# Patient Record
Sex: MALE | Race: BLACK OR AFRICAN AMERICAN | NOT HISPANIC OR LATINO | URBAN - METROPOLITAN AREA
[De-identification: names, ages, dates, MRNs, and addresses within clinical notes are randomized per-mention and may not be internally consistent; named-entity substitution may affect disease eponyms.]

---

## 2024-04-19 ENCOUNTER — CLINICAL SUPPORT (OUTPATIENT)
Dept: EMERGENCY MEDICINE | Facility: HOSPITAL | Age: 24
End: 2024-04-19
Payer: COMMERCIAL

## 2024-04-19 ENCOUNTER — APPOINTMENT (OUTPATIENT)
Dept: RADIOLOGY | Facility: HOSPITAL | Age: 24
End: 2024-04-19
Payer: COMMERCIAL

## 2024-04-19 ENCOUNTER — HOSPITAL ENCOUNTER (EMERGENCY)
Facility: HOSPITAL | Age: 24
Discharge: HOME | End: 2024-04-19
Attending: EMERGENCY MEDICINE
Payer: COMMERCIAL

## 2024-04-19 VITALS
RESPIRATION RATE: 18 BRPM | SYSTOLIC BLOOD PRESSURE: 127 MMHG | WEIGHT: 300 LBS | HEART RATE: 98 BPM | OXYGEN SATURATION: 96 % | BODY MASS INDEX: 40.63 KG/M2 | HEIGHT: 72 IN | TEMPERATURE: 97.5 F | DIASTOLIC BLOOD PRESSURE: 85 MMHG

## 2024-04-19 DIAGNOSIS — R07.9 ACUTE CHEST PAIN: Primary | ICD-10-CM

## 2024-04-19 LAB
ALBUMIN SERPL BCP-MCNC: 4 G/DL (ref 3.4–5)
ALP SERPL-CCNC: 83 U/L (ref 33–120)
ALT SERPL W P-5'-P-CCNC: 42 U/L (ref 10–52)
ANION GAP SERPL CALC-SCNC: 14 MMOL/L (ref 10–20)
AST SERPL W P-5'-P-CCNC: 26 U/L (ref 9–39)
BASOPHILS # BLD AUTO: 0.02 X10*3/UL (ref 0–0.1)
BASOPHILS NFR BLD AUTO: 0.3 %
BILIRUB SERPL-MCNC: 0.4 MG/DL (ref 0–1.2)
BUN SERPL-MCNC: 10 MG/DL (ref 6–23)
CALCIUM SERPL-MCNC: 9 MG/DL (ref 8.6–10.6)
CARDIAC TROPONIN I PNL SERPL HS: <3 NG/L (ref 0–53)
CHLORIDE SERPL-SCNC: 105 MMOL/L (ref 98–107)
CO2 SERPL-SCNC: 24 MMOL/L (ref 21–32)
CREAT SERPL-MCNC: 0.67 MG/DL (ref 0.5–1.3)
EGFRCR SERPLBLD CKD-EPI 2021: >90 ML/MIN/1.73M*2
EOSINOPHIL # BLD AUTO: 0.21 X10*3/UL (ref 0–0.7)
EOSINOPHIL NFR BLD AUTO: 2.9 %
ERYTHROCYTE [DISTWIDTH] IN BLOOD BY AUTOMATED COUNT: 14.6 % (ref 11.5–14.5)
GLUCOSE SERPL-MCNC: 94 MG/DL (ref 74–99)
HCT VFR BLD AUTO: 41.8 % (ref 41–52)
HGB BLD-MCNC: 14.3 G/DL (ref 13.5–17.5)
IMM GRANULOCYTES # BLD AUTO: 0.01 X10*3/UL (ref 0–0.7)
IMM GRANULOCYTES NFR BLD AUTO: 0.1 % (ref 0–0.9)
LYMPHOCYTES # BLD AUTO: 0.88 X10*3/UL (ref 1.2–4.8)
LYMPHOCYTES NFR BLD AUTO: 12.2 %
MCH RBC QN AUTO: 27.2 PG (ref 26–34)
MCHC RBC AUTO-ENTMCNC: 34.2 G/DL (ref 32–36)
MCV RBC AUTO: 80 FL (ref 80–100)
MONOCYTES # BLD AUTO: 0.43 X10*3/UL (ref 0.1–1)
MONOCYTES NFR BLD AUTO: 6 %
NEUTROPHILS # BLD AUTO: 5.64 X10*3/UL (ref 1.2–7.7)
NEUTROPHILS NFR BLD AUTO: 78.5 %
NRBC BLD-RTO: 0 /100 WBCS (ref 0–0)
PLATELET # BLD AUTO: 288 X10*3/UL (ref 150–450)
POTASSIUM SERPL-SCNC: 3.7 MMOL/L (ref 3.5–5.3)
PROT SERPL-MCNC: 7.3 G/DL (ref 6.4–8.2)
RBC # BLD AUTO: 5.25 X10*6/UL (ref 4.5–5.9)
SODIUM SERPL-SCNC: 139 MMOL/L (ref 136–145)
WBC # BLD AUTO: 7.2 X10*3/UL (ref 4.4–11.3)

## 2024-04-19 PROCEDURE — 93005 ELECTROCARDIOGRAM TRACING: CPT

## 2024-04-19 PROCEDURE — 85025 COMPLETE CBC W/AUTO DIFF WBC: CPT | Performed by: EMERGENCY MEDICINE

## 2024-04-19 PROCEDURE — 99285 EMERGENCY DEPT VISIT HI MDM: CPT | Performed by: EMERGENCY MEDICINE

## 2024-04-19 PROCEDURE — 84484 ASSAY OF TROPONIN QUANT: CPT | Performed by: EMERGENCY MEDICINE

## 2024-04-19 PROCEDURE — 71046 X-RAY EXAM CHEST 2 VIEWS: CPT

## 2024-04-19 PROCEDURE — 80053 COMPREHEN METABOLIC PANEL: CPT | Performed by: EMERGENCY MEDICINE

## 2024-04-19 PROCEDURE — 93010 ELECTROCARDIOGRAM REPORT: CPT | Performed by: EMERGENCY MEDICINE

## 2024-04-19 PROCEDURE — 36415 COLL VENOUS BLD VENIPUNCTURE: CPT | Performed by: EMERGENCY MEDICINE

## 2024-04-19 PROCEDURE — 99283 EMERGENCY DEPT VISIT LOW MDM: CPT | Mod: 25

## 2024-04-19 PROCEDURE — 71046 X-RAY EXAM CHEST 2 VIEWS: CPT | Performed by: RADIOLOGY

## 2024-04-19 ASSESSMENT — COLUMBIA-SUICIDE SEVERITY RATING SCALE - C-SSRS
6. HAVE YOU EVER DONE ANYTHING, STARTED TO DO ANYTHING, OR PREPARED TO DO ANYTHING TO END YOUR LIFE?: NO
1. IN THE PAST MONTH, HAVE YOU WISHED YOU WERE DEAD OR WISHED YOU COULD GO TO SLEEP AND NOT WAKE UP?: NO
2. HAVE YOU ACTUALLY HAD ANY THOUGHTS OF KILLING YOURSELF?: NO

## 2024-04-20 NOTE — ED PROVIDER NOTES
HPI   Chief Complaint   Patient presents with    Chest Pain    Shortness of Breath       HPI  Patient is a 23-year-old male with no reported past medical history presenting to the emergency department for 1 day history of midsternal chest pain and tightness. Patient states that he started having midsternal chest pain today while driving. The pain comes and goes and he is currently not experiencing any chest pain. He does not report any shortness of breath. No history of blood clots, tobacco use or long periods of travel. No recent surgeries. No history of cardiac disease. No cough or hemoptysis. falls or injuries. No headache, dizziness, vision changes, neck pain, nausea, vomiting, abdominal pain, numbness, tingling, cough, fevers, or chills. No leg pain or leg swelling. Pain does not radiate. Patient denies recent history of fevers, night sweats, cough, congestion, rhinorrhea, diarrhea, constipation, nausea, vomiting, sore throat, or myalgias. When asking about patient's mood and anxiety, he states that his sister just recently  and he was the one to find her. Since then, he feels that he has been emotional. He denies suicidal or self-harm ideations and states that he has a good supporting system of family members and friends that he is able to talk to about this. Patient is emotional at this time and states that he has been feeling a very large emotional burden for the past day. He denies any homicidal ideation. He denies any auditory or visual hallucinations. He reports that he feels safe at home. He denies any family history of heart attack, blood clots, or sudden cardiac death.                No data recorded                   Patient History   Past Medical History:   Diagnosis Date    Hypermetropia, unspecified eye 2017    Hyperopia    Other visual disturbances 2017    Blurred vision     No past surgical history on file.  No family history on file.  Social History     Tobacco Use    Smoking  status: Not on file    Smokeless tobacco: Not on file   Substance Use Topics    Alcohol use: Not on file    Drug use: Not on file       Physical Exam   ED Triage Vitals [04/19/24 2141]   Temperature Heart Rate Respirations BP   36.4 °C (97.5 °F) (!) 101 18 172/85      Pulse Ox Temp Source Heart Rate Source Patient Position   96 % Temporal -- --      BP Location FiO2 (%)     -- --       Physical Exam  Constitutional:       General: He is not in acute distress.     Appearance: Normal appearance. He is obese.   HENT:      Head: Normocephalic and atraumatic.   Eyes:      Extraocular Movements: Extraocular movements intact.      Pupils: Pupils are equal, round, and reactive to light.   Cardiovascular:      Rate and Rhythm: Normal rate and regular rhythm.      Comments: No tenderness to palpation over anterior chest wall. No overlying skin changes. No crepitus. No pathologic breath sounds. Breath sounds symmetric bilaterally.  Pulmonary:      Effort: Pulmonary effort is normal.      Breath sounds: Normal breath sounds. No wheezing, rhonchi or rales.   Abdominal:      General: Abdomen is flat.      Palpations: Abdomen is soft.      Tenderness: There is no abdominal tenderness.   Musculoskeletal:         General: No swelling or signs of injury. Normal range of motion.      Cervical back: Normal range of motion and neck supple.      Right lower leg: No edema.      Left lower leg: No edema.   Skin:     Findings: No rash.   Neurological:      General: No focal deficit present.      Mental Status: He is alert and oriented to person, place, and time.      Motor: No weakness.   Psychiatric:      Comments: Appearance/behavior: well groomed and appropriately dressed individual resting calmly  Cognition: A&0 x4  Mood and affect: sad mood and affect  Speech: appropriate rate, volume and fluency  Thought process: linear  Content: no derangements or delusions elicited  Perception: denies audio/visual hallucinations  Insight/judgement:  has good insight to current circumstances  Denies suicidal or homicidal ideation. Denies auditory or visual hallucinations. Does not appear internally stimulated.          ED Course & MDM   ED Course as of 24 ECG 12 lead  EKG Interpretation:   Rate: 97 BPM  Rhythm: Normal sinus rhythm  Axis: Regular  Intervals/waves: Regular MN interval and narrow QRS   ST changes: None appreciated [TRISH]      ED Course User Index  [TRISH] Rodrigo Azul MD         Diagnoses as of 24   Acute chest pain       Medical Decision Making  Patient is a 23-year-old male with no reported past medical history presenting to the emergency department for 1 day history of midsternal chest pain and tightness. He denies any current chest pain while in the ED. In the Emergency Department, hospital records were reviewed and IV access was obtained.  The patient is afebrile with stable vital signs. Initial heart rate in triage was 101 but repeat heart rate without any intervention was 98. The patient is in no respiratory distress, satting well on room air. Labwork and chest xray were ordered for further evaluation. Chest xray was obtained and independently reviewed and showed no acute processes. CBC and CMP were unremarkable. Troponin was negative at less than 3. Based on their history, lab analysis, EKG (which showed no evidence of ischemia or infarction), and imaging, in addition to the patient's physical exam, I see no evidence at this time for a malignant etiology for the patient's chest pain. Low suspicion for pulmonary embolus, acute myocardial infarction, pneumothorax, esophageal rupture, cardiac tamponade, thoracic artery dissection, or any other emergent cardiac, pulmonary or aortic pathology at this time. Patient denies history of sudden cardiac death or heart attacks in his family. It is clear that patient is facing a heavy emotional burden after finding his sister  and has been carrying  that for the past day. Symptoms could be related to stress or anxiety mood related to sisters death. He denies suicidal or self-harm ideations and states that he has a good support system at home.He denies any homicidal ideation. He reports that he feels safe at home.    Patient was informed of the results. He remained stable, resting comfortably. He felt comfortable being discharged home. Routine discharge counseling was given to the patient and the patient understands that worsening, changing, or persistent symptoms should prompt an immediate call or follow up with their primary physician or return to the emergency department immediately. Patient was discharged home in stable condition.         Procedure  Procedures     Rodrigo Azul MD  Resident  04/20/24 0047    I saw and evaluated the patient. I personally obtained the key and critical portions of the history and physical exam or was physically present for key and critical portions performed by the resident/fellow. I reviewed the resident/fellow's documentation and discussed the patient with the resident/fellow. I agree with the resident/fellow's medical decision making as documented in the note. The patient was informed of the results. The patient felt comfortable being discharged home. The patient was instructed of supportive measures and to follow-up with a primary care physician. Return precautions were provided, for which the patient expressed understanding. The patient was discharged home in stable condition. They should feel free to return to the Emergency Department at any time should their condition worsen or should they have any questions or concerns.     MD Elzbieta Alvarado MD  04/20/24 2723

## 2024-04-21 LAB
ATRIAL RATE: 97 BPM
P AXIS: 52 DEGREES
P OFFSET: 184 MS
P ONSET: 136 MS
PR INTERVAL: 168 MS
Q ONSET: 220 MS
QRS COUNT: 16 BEATS
QRS DURATION: 86 MS
QT INTERVAL: 346 MS
QTC CALCULATION(BAZETT): 439 MS
QTC FREDERICIA: 405 MS
R AXIS: 25 DEGREES
T AXIS: 29 DEGREES
T OFFSET: 393 MS
VENTRICULAR RATE: 97 BPM

## 2024-06-12 ENCOUNTER — APPOINTMENT (OUTPATIENT)
Dept: DERMATOLOGY | Facility: CLINIC | Age: 24
End: 2024-06-12
Payer: COMMERCIAL

## 2025-02-05 ENCOUNTER — APPOINTMENT (OUTPATIENT)
Dept: DERMATOLOGY | Facility: CLINIC | Age: 25
End: 2025-02-05
Payer: COMMERCIAL

## 2025-02-05 DIAGNOSIS — L83 ACANTHOSIS NIGRICANS: Primary | ICD-10-CM

## 2025-02-05 DIAGNOSIS — L20.89 OTHER ATOPIC DERMATITIS: ICD-10-CM

## 2025-02-05 PROCEDURE — 99204 OFFICE O/P NEW MOD 45 MIN: CPT | Performed by: STUDENT IN AN ORGANIZED HEALTH CARE EDUCATION/TRAINING PROGRAM

## 2025-02-05 RX ORDER — TRIAMCINOLONE ACETONIDE 1 MG/G
OINTMENT TOPICAL
Qty: 80 G | Refills: 11 | Status: SHIPPED | OUTPATIENT
Start: 2025-02-05

## 2025-02-05 ASSESSMENT — ITCH NUMERIC RATING SCALE: HOW SEVERE IS YOUR ITCHING?: 1

## 2025-02-05 ASSESSMENT — DERMATOLOGY QUALITY OF LIFE (QOL) ASSESSMENT
ARE THERE EXCLUSIONS OR EXCEPTIONS FOR THE QUALITY OF LIFE ASSESSMENT: NO
RATE HOW BOTHERED YOU ARE BY EFFECTS OF YOUR SKIN PROBLEMS ON YOUR ACTIVITIES (EG, GOING OUT, ACCOMPLISHING WHAT YOU WANT, WORK ACTIVITIES OR YOUR RELATIONSHIPS WITH OTHERS): 0 - NEVER BOTHERED
RATE HOW EMOTIONALLY BOTHERED YOU ARE BY YOUR SKIN PROBLEM (FOR EXAMPLE, WORRY, EMBARRASSMENT, FRUSTRATION): 0 - NEVER BOTHERED
RATE HOW BOTHERED YOU ARE BY SYMPTOMS OF YOUR SKIN PROBLEM (EG, ITCHING, STINGING BURNING, HURTING OR SKIN IRRITATION): 0 - NEVER BOTHERED
DATE THE QUALITY-OF-LIFE ASSESSMENT WAS COMPLETED: 67241
WHAT SINGLE SKIN CONDITION LISTED BELOW IS THE PATIENT ANSWERING THE QUALITY-OF-LIFE ASSESSMENT QUESTIONS ABOUT: DERMATITIS

## 2025-02-05 ASSESSMENT — DERMATOLOGY PATIENT ASSESSMENT: DO YOU HAVE ANY NEW OR CHANGING LESIONS: NO

## 2025-02-05 ASSESSMENT — PATIENT GLOBAL ASSESSMENT (PGA): PATIENT GLOBAL ASSESSMENT: PATIENT GLOBAL ASSESSMENT:  2 - MILD

## 2025-02-05 NOTE — PROGRESS NOTES
"Subjective     Warner Mccray is a 24 y.o. male who presents for the following: Eczema (Neck - Pt reports having \"plaques\" that are untreated. ) and Pigment Change (Pt concerned about Hyperpigmentation).     Review of Systems:  No other skin or systemic complaints other than what is documented elsewhere in the note.    The following portions of the chart were reviewed this encounter and updated as appropriate:          Skin Cancer History  No skin cancer on file.      Specialty Problems    None       Objective   Well appearing patient in no apparent distress; mood and affect are within normal limits.    A focused skin examination was performed. All findings within normal limits unless otherwise noted below.    Left Posterior Neck, Posterior Mid Neck, Right Posterior Neck  Thick, velvety hyperpigmented plaque on the posterior neck    Left Forearm - Posterior, Left Hand - Posterior, Left Lower Leg - Posterior, Right Forearm - Posterior, Right Hand - Posterior, Right Lower Leg - Posterior  Hyperpigmented lichenified plaques with overlying scales           Assessment/Plan   Acanthosis nigricans (3)  Left Posterior Neck; Right Posterior Neck; Posterior Mid Neck    -We reviewed the etiology of acanthosis nigricans in detail with the patient.  Acanthosis nigricans (AN) is presents as symmetric brown, velvety plaques that involve primarily the skin folds such as the axillae, posterior and lateral neck folds.  Initially only hyperpigmentation is noted, however thickening and accentuation of the skin markings follows.  Hyperinsulinemia can predispose individuals to AN.  Adults with AN often have greater body weight, greater basal and glucose-stimulated insulin levels during oral glucose tolerance testing, and lower insulin sensitivity.  The presence of AN can indicate that a high risk for development of non-insulin dependent diabetes mellitus. The pathophysiology of AN in patients with hyperinsulinemia may result from " insulin-like growth factor binding to its cognate receptor in the epidermis, resulting in thickening of the skin.   -We discussed treatments in detail.  In cases associated with obesity, weight reduction may help to reverse or at least stabilize the process.     - Encourage patient to meet with his PCP to discuss weight loss assistance and be evaluated for insulin resistance    Other atopic dermatitis  Left Hand - Posterior; Right Hand - Posterior; Left Forearm - Posterior; Right Forearm - Posterior; Left Lower Leg - Posterior; Right Lower Leg - Posterior    -mild, without evidence of superinfection; poorly controlled. Involvement of the legs, arm, and neck  -Atopic dermatitis was reviewed in detail including pathogenesis of the disorder.   -We reviewed that atopic dermatitis is a multifactorial disorder.  In patients who are predisposed, there is defective barrier function of the skin.  This can lead to excess water loss which turns into xerosis.  Inflammation then follows which can then cause symptoms of pruritus.  An effective treatment regimen addresses all of these issues.    -We also reviewed the waxing and waning course of atopic dermatitis and discussed the concept that this is a chronic disorder where a cure is not possible, but the goal of treatment is to control the disease.   -Treatment options discussed in detail.  -For THIN areas of eczema on the body: Begin use of Triamcinolone 0.1% ointment twice daily until flat/smooth  -Potential side effects of topical steroids and proper use discussed in detail.    Xerosis Cutis  -We discussed the etiology of dry skin as related to atopic dermatitis.  -Recommend daily baths for 5 minutes in lukewarm water with gentle fragrance free cleansers.  When out of the shower pat dry and apply an emollient (ointment based preferred) to the skin while still damp.  -A handout was provided for reference.      Related Medications  triamcinolone (Kenalog) 0.1 % ointment  Use  twice a day to affected area use as needed for itchy and irritation. Avoid face and groin.    RTC as needed     My Jesi, DO  Department of Dermatology    I was present during all key portions of visit including history, exam, discussion/plan and/or procedures and directly supervised our resident during all portions of the visit, follow up care, medications and more    Ronaldo Macias MD

## 2025-03-25 ENCOUNTER — APPOINTMENT (OUTPATIENT)
Dept: OPHTHALMOLOGY | Facility: CLINIC | Age: 25
End: 2025-03-25
Payer: COMMERCIAL

## 2025-04-10 ENCOUNTER — APPOINTMENT (OUTPATIENT)
Facility: HOSPITAL | Age: 25
End: 2025-04-10
Payer: COMMERCIAL

## 2025-08-25 ENCOUNTER — OFFICE VISIT (OUTPATIENT)
Facility: HOSPITAL | Age: 25
End: 2025-08-25
Payer: COMMERCIAL

## 2025-08-25 VITALS
BODY MASS INDEX: 42.66 KG/M2 | DIASTOLIC BLOOD PRESSURE: 78 MMHG | TEMPERATURE: 97 F | HEART RATE: 60 BPM | OXYGEN SATURATION: 93 % | WEIGHT: 315 LBS | HEIGHT: 72 IN | SYSTOLIC BLOOD PRESSURE: 120 MMHG

## 2025-08-25 DIAGNOSIS — E66.813 CLASS 3 OBESITY: Primary | ICD-10-CM

## 2025-08-25 PROCEDURE — 99212 OFFICE O/P EST SF 10 MIN: CPT

## 2025-08-25 PROCEDURE — 3008F BODY MASS INDEX DOCD: CPT | Performed by: FAMILY MEDICINE

## 2025-08-25 PROCEDURE — 99385 PREV VISIT NEW AGE 18-39: CPT | Performed by: FAMILY MEDICINE

## 2025-08-25 PROCEDURE — 1036F TOBACCO NON-USER: CPT | Performed by: FAMILY MEDICINE

## 2025-08-25 ASSESSMENT — ENCOUNTER SYMPTOMS: CONSTITUTIONAL NEGATIVE: 1

## 2025-08-25 ASSESSMENT — PAIN SCALES - GENERAL: PAINLEVEL_OUTOF10: 0-NO PAIN

## 2025-08-26 LAB
CHOLEST SERPL-MCNC: 199 MG/DL
CHOLEST/HDLC SERPL: 4.5 (CALC)
HCV AB SERPL QL IA: NORMAL
HDLC SERPL-MCNC: 44 MG/DL
HIV 1+2 AB+HIV1 P24 AG SERPL QL IA: NORMAL
HIV 1+2 AB+HIV1 P24 AG SERPL QL IA: NORMAL
LDLC SERPL CALC-MCNC: 139 MG/DL (CALC)
NONHDLC SERPL-MCNC: 155 MG/DL (CALC)
TRIGL SERPL-MCNC: 72 MG/DL